# Patient Record
Sex: MALE | Race: WHITE | NOT HISPANIC OR LATINO | URBAN - METROPOLITAN AREA
[De-identification: names, ages, dates, MRNs, and addresses within clinical notes are randomized per-mention and may not be internally consistent; named-entity substitution may affect disease eponyms.]

---

## 2019-06-20 ENCOUNTER — EMERGENCY (EMERGENCY)
Facility: HOSPITAL | Age: 29
LOS: 1 days | Discharge: HOME | End: 2019-06-20
Attending: EMERGENCY MEDICINE
Payer: OTHER MISCELLANEOUS

## 2019-06-20 VITALS
RESPIRATION RATE: 19 BRPM | OXYGEN SATURATION: 97 % | HEART RATE: 67 BPM | HEIGHT: 70 IN | TEMPERATURE: 98 F | WEIGHT: 164.91 LBS | DIASTOLIC BLOOD PRESSURE: 81 MMHG | SYSTOLIC BLOOD PRESSURE: 123 MMHG

## 2019-06-20 PROCEDURE — 99283 EMERGENCY DEPT VISIT LOW MDM: CPT

## 2019-06-20 RX ORDER — IBUPROFEN 200 MG
600 TABLET ORAL ONCE
Refills: 0 | Status: COMPLETED | OUTPATIENT
Start: 2019-06-20 | End: 2019-06-20

## 2019-06-20 RX ADMIN — Medication 600 MILLIGRAM(S): at 19:14

## 2019-06-20 NOTE — ED ADULT NURSE NOTE - NSIMPLEMENTINTERV_GEN_ALL_ED
Implemented All Universal Safety Interventions:  [place] to call system. Call bell, personal items and telephone within reach. Instruct patient to call for assistance. Room bathroom lighting operational. Non-slip footwear when patient is off stretcher. Physically safe environment: no spills, clutter or unnecessary equipment. Stretcher in lowest position, wheels locked, appropriate side rails in place.

## 2019-06-20 NOTE — ED PROVIDER NOTE - CLINICAL SUMMARY MEDICAL DECISION MAKING FREE TEXT BOX
Nemes - 28yo M c/o right knee pain after running, tripping and landing on his knee, posterior area, worsened with flexion and extension. Ambulatory. No other injuries/complaints. No TTP, full ROM, min pain reproduced to upper knee if bearing weight. Discussed need for Xrays, agree w no imaging. Low suspicion for fx, likley contusion/strain. Will treat, good return/follow up  instructions w Ortho provided

## 2019-06-20 NOTE — ED ADULT NURSE NOTE - CAS TRG GENERAL NORM CIRC DET
Patient seen for [facility] standards of care, Imer Scale Score: 19 (03/02/17 0856), Discussed with JAYA Frances, No skin concerns noted. Discussed with patient, agreed to allow writer to examine heels, elbows and buttocks. Noted dry skin, scar tissue present to chest and upper arms. No active drainage, or open areas. Patient is currently on a low air loss mattress, pillows used to reposition. Education provided and FYWB given.   Strong peripheral pulses

## 2019-06-20 NOTE — ED ADULT NURSE NOTE - OBJECTIVE STATEMENT
Male 29 years old with no medical history came in for right knee pain 3/10 after falling while running. Denies head injury. Denies numbness/tingling of right leg. Denies weakness. No bruise, abrasion or laceration on right knee. Will continue to reassess.

## 2019-06-20 NOTE — ED ADULT NURSE NOTE - CHPI ED NUR SYMPTOMS NEG
no stiffness/no bruising/no difficulty bearing weight/no weakness/no tingling/no fever/no back pain/no deformity/no abrasion/no numbness

## 2019-06-20 NOTE — ED PROVIDER NOTE - PHYSICAL EXAMINATION
CONSTITUTIONAL: Patient is awake, alert and oriented x 3. Patient is well appearing and in no acute distress.  HEAD: NCAT,   NECK: supple,    EXTREMITY: no edema or calf tenderness b/l, FROM upper and lower ext b/l. RLE: No obvious deformity of the knee, No obvious ligamentous instability. +Mild ttp anterior and posterior knee with effusion. FROM w/out limitation. No deformity or ttp right femur, hip, shin, ankle    SKIN: with no rash or lesions.   NEURO: No focal deficits

## 2019-06-20 NOTE — ED PROVIDER NOTE - NSFOLLOWUPINSTRUCTIONS_ED_ALL_ED_FT
1. Rest. Apply cold pack for 20 minutes multiple times daily. Keep your leg elevated when possible   2. For pain Take Motrin 600mg every 6 hours alternated with Tylenol 1g every 6 hours   3. Follow up with your primary care in the next 1-2 days   4. For continued pain you can follow up with ortho see attached list   5. Return to ER for new or worsened symptoms including severe pain, swelling, numbness/tingling, bluish discoloration or any concern.

## 2019-07-22 ENCOUNTER — EMERGENCY (EMERGENCY)
Facility: HOSPITAL | Age: 29
LOS: 1 days | Discharge: HOME | End: 2019-07-22
Attending: EMERGENCY MEDICINE
Payer: COMMERCIAL

## 2019-07-22 VITALS
SYSTOLIC BLOOD PRESSURE: 126 MMHG | HEIGHT: 71 IN | DIASTOLIC BLOOD PRESSURE: 81 MMHG | WEIGHT: 164.91 LBS | HEART RATE: 75 BPM | RESPIRATION RATE: 20 BRPM | OXYGEN SATURATION: 99 % | TEMPERATURE: 98 F

## 2019-07-22 PROCEDURE — 73660 X-RAY EXAM OF TOE(S): CPT | Mod: 26,LT

## 2019-07-22 PROCEDURE — 73660 X-RAY EXAM OF TOE(S): CPT

## 2019-07-22 PROCEDURE — 99283 EMERGENCY DEPT VISIT LOW MDM: CPT

## 2019-07-22 NOTE — ED PROVIDER NOTE - PHYSICAL EXAMINATION
General: A&Ox3, well nourished, no acute distress  HENT: NC/AT. Posterior oropharynx clear. Patent airway  Eyes: PERRL, EOMI  CV: RRR, no m/r/g. 2+ peripheral pulses. Extremities are warm and well perfused.  Respiratory: CTAB no w/r/r. No respiratory distress.  Abdominal: soft, non-distended, non-tender, no rebound, guarding, or rigidity  Neuro: No focal deficits  Skin: no rashes  MSK: ecchymosis throughout the dorsal and medial surface of the left great toe. Mild ttp on the dorsal surface. No deformity.  Psych: normal mood and affect

## 2019-07-22 NOTE — ED PROVIDER NOTE - NSFOLLOWUPINSTRUCTIONS_ED_ALL_ED_FT
You can take acetaminophen (aka tylenol, 1000 mg every 6-8 hours) for pain. You can also take ibuprofen (600 mg every 6-8 hours) in addition if tylenol alone does not improve the pain. Do not exceed 4000 mg acetaminophen (tylenol) in a 24 hour period. Be aware if any of your other medications contain acetaminophen so as not to exceed the maximum daily dose.    Follow up with podiatry in 3-5 days if pain persists.    Return to the emergency department for severe intractable pain or any other new concerning symptoms.

## 2019-07-22 NOTE — ED PROVIDER NOTE - CLINICAL SUMMARY MEDICAL DECISION MAKING FREE TEXT BOX
Jonathan Weil, PGY3 - moderate suspicion for fracture with significant ecchymosis but surprisingly little tenderness to palpation. Will obtain xrays. Patient declining analgesics. Jonathan Weil, PGY3 - moderate suspicion for fracture with significant ecchymosis but surprisingly little tenderness to palpation. Will obtain xrays. Patient declining analgesics.    KAMLESH Garcia MD: 29M p/w L great toe pain s/p accidentally stubbing toe, now with bruising to toe. Will r/o fx, treat pain, and reassess. Will provide podiatry outpt f/u with return precautions.

## 2019-07-22 NOTE — ED ADULT NURSE NOTE - OBJECTIVE STATEMENT
Male 29 years old with no medical history came in for left big toe pain after hitting his foot on the curve. Bruising noted on left big toe with mild swelling. Denies numbness/tingling of left foot. Toes mobile. Pt took Advil 400 mg at 10 am. Doesn't want pain meds at this time. Will continue to reassess.

## 2019-07-22 NOTE — ED PROVIDER NOTE - OBJECTIVE STATEMENT
29M p/w L great toe pain for a couple hours. He stubbed it on a metal curb while wearing his shoes. Has had bruising of the toe with mild pain since. No other injuries.

## 2019-07-22 NOTE — ED PROVIDER NOTE - NSFOLLOWUPCLINICS_GEN_ALL_ED_FT
[facility]  Podiatry  [address] [place] [address] - [address] [place] [address]  Phone: [phone]  Fax:   Follow Up Time:

## 2019-07-22 NOTE — ED ADULT NURSE NOTE - NSIMPLEMENTINTERV_GEN_ALL_ED
Implemented All Universal Safety Interventions:  [place] to call system. Call bell, personal items and telephone within reach. Instruct patient to call for assistance. Room bathroom lighting operational. Non-slip footwear when patient is off stretcher. Physically safe environment: no spills, clutter or unnecessary equipment. Stretcher in lowest position, wheels locked, appropriate side rails in place.

## 2019-07-22 NOTE — ED PROVIDER NOTE - PROGRESS NOTE DETAILS
Jonathan Weil, PGY3 - xray negative. Ramesh taped great toe to 2nd toe for comfort. Instructed patient on analgesics, podiatry f/u for continued pain, and return precautions. All questions answered.

## 2020-08-19 NOTE — ED PROVIDER NOTE - OBJECTIVE STATEMENT
"Requested Prescriptions   Pending Prescriptions Disp Refills     losartan (COZAAR) 25 MG tablet 90 tablet 3     Sig: Take 1 tablet (25 mg) by mouth daily   Last Written Prescription Date:  05/23/20  Last Fill Quantity: 90,  # refills: 3   Last office visit: 05/05/2020 with prescribing provider:  ELIGIO Ibanez   Future Office Visit:              Angiotensin-II Receptors Failed - 8/17/2020  9:03 AM        Failed - Normal serum creatinine on file in past 12 months     Recent Labs   Lab Test 08/01/19  1001   CR 0.61       Ok to refill medication if creatinine is low          Failed - Normal serum potassium on file in past 12 months     Recent Labs   Lab Test 08/01/19  1001   POTASSIUM 4.2                    Passed - Last blood pressure under 140/90 in past 12 months     BP Readings from Last 3 Encounters:   03/09/20 133/85   11/13/19 132/77   10/30/19 (!) 144/74                 Passed - Recent (12 mo) or future (30 days) visit within the authorizing provider's specialty     Patient has had an office visit with the authorizing provider or a provider within the authorizing providers department within the previous 12 mos or has a future within next 30 days. See \"Patient Info\" tab in inbasket, or \"Choose Columns\" in Meds & Orders section of the refill encounter.              Passed - Medication is active on med list        Passed - Patient is age 18 or older        Passed - No active pregnancy on record        Passed - No positive pregnancy test in past 12 months             "
Routing refill request to provider for review/approval because:  Labs not current:  See Below (Creatinine/K+)    Last Written Prescription Date:  8/1/19  Last Fill Quantity: 90,   refills: 3    Last office visit: 3/9/2020 with prescribing provider:  Leonela Ibanez PA-C     Future Office Visit:  None    Pended for 1 month supply with appointment reminder.    Nuha Johnston RN BSN                  
29 year old male presents c/o right knee pain after running, tripping and landing on his knee. States pain is most severe in the posterior area, worsened with flexion and extension. Has not taken anything for pain thus far. Patient has been ambulatory. Denies other msk pain, numbness, weakness, instability.

## 2021-01-16 ENCOUNTER — EMERGENCY (EMERGENCY)
Facility: HOSPITAL | Age: 31
LOS: 1 days | Discharge: HOME | End: 2021-01-16
Attending: EMERGENCY MEDICINE
Payer: SELF-PAY

## 2021-01-16 VITALS
DIASTOLIC BLOOD PRESSURE: 91 MMHG | HEIGHT: 71 IN | RESPIRATION RATE: 18 BRPM | OXYGEN SATURATION: 97 % | WEIGHT: 160.06 LBS | TEMPERATURE: 98 F | HEART RATE: 69 BPM | SYSTOLIC BLOOD PRESSURE: 148 MMHG

## 2021-01-16 PROCEDURE — 99284 EMERGENCY DEPT VISIT MOD MDM: CPT | Mod: 57

## 2021-01-16 PROCEDURE — 73130 X-RAY EXAM OF HAND: CPT

## 2021-01-16 PROCEDURE — 26750 TREAT FINGER FRACTURE EACH: CPT | Mod: 54

## 2021-01-16 PROCEDURE — 99284 EMERGENCY DEPT VISIT MOD MDM: CPT | Mod: 25

## 2021-01-16 PROCEDURE — 70450 CT HEAD/BRAIN W/O DYE: CPT

## 2021-01-16 PROCEDURE — 26750 TREAT FINGER FRACTURE EACH: CPT | Mod: FA

## 2021-01-16 PROCEDURE — 73130 X-RAY EXAM OF HAND: CPT | Mod: 26,LT

## 2021-01-16 PROCEDURE — 70450 CT HEAD/BRAIN W/O DYE: CPT | Mod: 26

## 2021-01-16 NOTE — ED PROVIDER NOTE - CARE PROVIDER_API CALL
Tito Raymundo)  Plastic Surgery; Surgery  [address], [address]  [place] [address]  Phone: [phone]  Fax: [phone]  Follow Up Time: 4-6 Days

## 2021-01-16 NOTE — ED PROVIDER NOTE - NSFOLLOWUPCLINICS_GEN_ALL_ED_FT
[facility] [place]  Orthopedics  .  [place]   Phone: [phone]  Fax:   Follow Up Time: 4-6 Days

## 2021-01-16 NOTE — ED PROVIDER NOTE - OBJECTIVE STATEMENT
30 year old M  with no significant PMHx or PSHx presents to ED c/o  abrasions to  R forehead and bl hands. Pt was fell off an 8ft wooden fence that broke and pt fell face first onto concrete 1hr PTA. No LOC. No daily meds. NKDA. Vaccines UTD

## 2021-01-16 NOTE — ED PROVIDER NOTE - CARE PLAN
Principal Discharge DX:	Closed nondisplaced fracture of distal phalanx of left thumb, initial encounter  Secondary Diagnosis:	Closed head injury, initial encounter

## 2021-01-16 NOTE — ED PROVIDER NOTE - CLINICAL SUMMARY MEDICAL DECISION MAKING FREE TEXT BOX
30 year old M  with no significant PMHx or PSHx presents to ED c/o  abrasions to  R forehead and bl hands. Patient well-appearing here, VSS, superficial abrasions on exam with no active bleeding, no indication for repair. Will CT head to r/o ICH and xray L hand to r/o fracture, likely discharge pending normal imaging.

## 2021-01-16 NOTE — ED PROVIDER NOTE - NSFOLLOWUPINSTRUCTIONS_ED_ALL_ED_FT
YOU WERE SEEN IN THE ED FOR: FOREHEAD INJURY    FOR PAIN/FEVER, YOU MAY TAKE TYLENOL (acetaminophen) AND/OR IBUPROFEN (advil or motrin). FOLLOW THE INSTRUCTIONS ON THE LABEL/CONTAINER.    PLEASE FOLLOW UP WITH YOUR PRIVATE PHYSICIAN WITHIN THE NEXT 3-5 DAYS. BRING COPIES OF YOUR RESULTS.  -If you do not have a PMD, please call [phone] to find one convenient for you or call our clinic at [phone].    RETURN TO THE EMERGENCY DEPARTMENT IF YOU EXPERIENCE ANY NEW/CONCERNING/WORSENING SYMPTOMS SUCH AS BUT NOT LIMITED TO: LOSS OF CONSCIOUSNESS, NUMBNESS, WEAKNESS, SEVERE HEADACHE, VOMITING, VISION CHANGES YOU WERE SEEN IN THE ED FOR: FOREHEAD INJURY    FOR PAIN/FEVER, YOU MAY TAKE TYLENOL (acetaminophen) AND/OR IBUPROFEN (advil or motrin). FOLLOW THE INSTRUCTIONS ON THE LABEL/CONTAINER.    PLEASE FOLLOW UP WITH YOUR PRIVATE PHYSICIAN WITHIN THE NEXT 3-5 DAYS. BRING COPIES OF YOUR RESULTS.  -If you do not have a PMD, please call [phone] to find one convenient for you or call our clinic at [phone].    RETURN TO THE EMERGENCY DEPARTMENT IF YOU EXPERIENCE ANY NEW/CONCERNING/WORSENING SYMPTOMS SUCH AS BUT NOT LIMITED TO: LOSS OF CONSCIOUSNESS, NUMBNESS, WEAKNESS, SEVERE HEADACHE, VOMITING, VISION CHANGES    ***You have a thumb fracture -- recommend follow up hand surgeon for re-evaluation.  Keep in splint except for cleaning.

## 2021-01-16 NOTE — ED PROVIDER NOTE - PATIENT PORTAL LINK FT
You can access the FollowMyHealth Patient Portal offered by [facility] by registering at the following website: [email or web address]. By joining [employer]’s FollowMyHealth portal, you will also be able to view your health information using other applications (apps) compatible with our system.

## 2023-04-04 PROBLEM — Z00.00 ENCOUNTER FOR PREVENTIVE HEALTH EXAMINATION: Status: ACTIVE | Noted: 2023-04-04

## 2023-04-11 ENCOUNTER — NON-APPOINTMENT (OUTPATIENT)
Age: 33
End: 2023-04-11

## 2024-01-17 ENCOUNTER — NON-APPOINTMENT (OUTPATIENT)
Age: 34
End: 2024-01-17

## 2024-02-28 ENCOUNTER — NON-APPOINTMENT (OUTPATIENT)
Age: 34
End: 2024-02-28

## 2024-06-14 ENCOUNTER — NON-APPOINTMENT (OUTPATIENT)
Age: 34
End: 2024-06-14

## 2024-08-13 ENCOUNTER — NON-APPOINTMENT (OUTPATIENT)
Age: 34
End: 2024-08-13

## 2024-12-26 ENCOUNTER — NON-APPOINTMENT (OUTPATIENT)
Age: 34
End: 2024-12-26

## 2025-03-20 NOTE — ED PROVIDER NOTE - NEURO NEGATIVE STATEMENT, MLM
The patient is a 40y Female complaining of vomiting.
no loss of consciousness, no gait abnormality, no headache, no sensory deficits, and no weakness.